# Patient Record
Sex: FEMALE | Race: OTHER | NOT HISPANIC OR LATINO | Employment: FULL TIME | URBAN - METROPOLITAN AREA
[De-identification: names, ages, dates, MRNs, and addresses within clinical notes are randomized per-mention and may not be internally consistent; named-entity substitution may affect disease eponyms.]

---

## 2017-01-21 ENCOUNTER — HOSPITAL ENCOUNTER (EMERGENCY)
Facility: MEDICAL CENTER | Age: 22
End: 2017-01-21
Attending: EMERGENCY MEDICINE
Payer: COMMERCIAL

## 2017-01-21 VITALS
HEART RATE: 89 BPM | HEIGHT: 65 IN | TEMPERATURE: 97.5 F | DIASTOLIC BLOOD PRESSURE: 58 MMHG | SYSTOLIC BLOOD PRESSURE: 105 MMHG | RESPIRATION RATE: 18 BRPM | BODY MASS INDEX: 38.97 KG/M2 | OXYGEN SATURATION: 99 % | WEIGHT: 233.91 LBS

## 2017-01-21 DIAGNOSIS — J03.90 TONSILLITIS: ICD-10-CM

## 2017-01-21 LAB
BASOPHILS # BLD AUTO: 0.2 % (ref 0–1.8)
BASOPHILS # BLD: 0.03 K/UL (ref 0–0.12)
DEPRECATED S PYO AG THROAT QL EIA: NORMAL
EOSINOPHIL # BLD AUTO: 0.03 K/UL (ref 0–0.51)
EOSINOPHIL NFR BLD: 0.2 % (ref 0–6.9)
ERYTHROCYTE [DISTWIDTH] IN BLOOD BY AUTOMATED COUNT: 41.6 FL (ref 35.9–50)
HCT VFR BLD AUTO: 42.7 % (ref 37–47)
HETEROPH AB SER QL: NEGATIVE
HGB BLD-MCNC: 13.6 G/DL (ref 12–16)
IMM GRANULOCYTES # BLD AUTO: 0.05 K/UL (ref 0–0.11)
IMM GRANULOCYTES NFR BLD AUTO: 0.4 % (ref 0–0.9)
LYMPHOCYTES # BLD AUTO: 3.01 K/UL (ref 1–4.8)
LYMPHOCYTES NFR BLD: 23.4 % (ref 22–41)
MCH RBC QN AUTO: 26.6 PG (ref 27–33)
MCHC RBC AUTO-ENTMCNC: 31.9 G/DL (ref 33.6–35)
MCV RBC AUTO: 83.4 FL (ref 81.4–97.8)
MONOCYTES # BLD AUTO: 0.65 K/UL (ref 0–0.85)
MONOCYTES NFR BLD AUTO: 5.1 % (ref 0–13.4)
NEUTROPHILS # BLD AUTO: 9.08 K/UL (ref 2–7.15)
NEUTROPHILS NFR BLD: 70.7 % (ref 44–72)
NRBC # BLD AUTO: 0 K/UL
NRBC BLD AUTO-RTO: 0 /100 WBC
PLATELET # BLD AUTO: 322 K/UL (ref 164–446)
PMV BLD AUTO: 10.2 FL (ref 9–12.9)
RBC # BLD AUTO: 5.12 M/UL (ref 4.2–5.4)
SIGNIFICANT IND 70042: NORMAL
SITE SITE: NORMAL
SOURCE SOURCE: NORMAL
WBC # BLD AUTO: 12.9 K/UL (ref 4.8–10.8)

## 2017-01-21 PROCEDURE — 700105 HCHG RX REV CODE 258: Performed by: EMERGENCY MEDICINE

## 2017-01-21 PROCEDURE — 87880 STREP A ASSAY W/OPTIC: CPT

## 2017-01-21 PROCEDURE — 96361 HYDRATE IV INFUSION ADD-ON: CPT

## 2017-01-21 PROCEDURE — 85025 COMPLETE CBC W/AUTO DIFF WBC: CPT

## 2017-01-21 PROCEDURE — 99284 EMERGENCY DEPT VISIT MOD MDM: CPT

## 2017-01-21 PROCEDURE — 87081 CULTURE SCREEN ONLY: CPT

## 2017-01-21 PROCEDURE — 96374 THER/PROPH/DIAG INJ IV PUSH: CPT

## 2017-01-21 PROCEDURE — 86308 HETEROPHILE ANTIBODY SCREEN: CPT

## 2017-01-21 PROCEDURE — 700111 HCHG RX REV CODE 636 W/ 250 OVERRIDE (IP): Performed by: EMERGENCY MEDICINE

## 2017-01-21 PROCEDURE — 87077 CULTURE AEROBIC IDENTIFY: CPT

## 2017-01-21 RX ORDER — AMOXICILLIN AND CLAVULANATE POTASSIUM 875; 125 MG/1; MG/1
1 TABLET, FILM COATED ORAL 2 TIMES DAILY
Qty: 20 TAB | Refills: 0 | Status: SHIPPED | OUTPATIENT
Start: 2017-01-21 | End: 2017-01-31

## 2017-01-21 RX ORDER — DEXAMETHASONE SODIUM PHOSPHATE 10 MG/ML
10 INJECTION, SOLUTION INTRAMUSCULAR; INTRAVENOUS ONCE
Status: COMPLETED | OUTPATIENT
Start: 2017-01-21 | End: 2017-01-21

## 2017-01-21 RX ORDER — PREDNISONE 20 MG/1
60 TABLET ORAL DAILY
Qty: 15 TAB | Refills: 0 | Status: SHIPPED | OUTPATIENT
Start: 2017-01-21 | End: 2017-01-26

## 2017-01-21 RX ORDER — SODIUM CHLORIDE 9 MG/ML
1000 INJECTION, SOLUTION INTRAVENOUS ONCE
Status: COMPLETED | OUTPATIENT
Start: 2017-01-21 | End: 2017-01-21

## 2017-01-21 RX ADMIN — SODIUM CHLORIDE 1000 ML: 9 INJECTION, SOLUTION INTRAVENOUS at 19:58

## 2017-01-21 RX ADMIN — DEXAMETHASONE SODIUM PHOSPHATE 10 MG: 10 INJECTION, SOLUTION INTRAMUSCULAR; INTRAVENOUS at 19:58

## 2017-01-21 ASSESSMENT — PAIN SCALES - GENERAL: PAINLEVEL_OUTOF10: 10

## 2017-01-21 NOTE — ED AVS SNAPSHOT
Nonoba Access Code: [email or web address]  Expires: 2/20/2017  8:37 PM    Your email address is not on file at [facility].  Email Addresses are required for you to sign up for Nonoba, please contact [phone] to verify your personal information and to provide your email address prior to attempting to register for Nonoba.    Cynthia Padilla  [address]  [place] [address]    Nonoba  A secure, online tool to manage your health information     [facility]’s Nonoba® is a secure, online tool that connects you to your personalized health information from the privacy of your home -- day or night - making it very easy for you to manage your healthcare. Once the activation process is completed, you can even access your medical information using the Nonoba nestor, which is available for free in the Apple Nestor store or Google Play store.     To learn more about Nonoba, visit [email or web address]    There are two levels of access available (as shown below):   My Chart Features  [facility] Primary Care Doctor [facility]  Specialists [facility]  Urgent  Care [facility] Primary Care Doctor   Email your healthcare team securely and privately 24/7 X X X    Manage appointments: schedule your next appointment; view details of past/upcoming appointments X      Request prescription refills. X      View recent personal medical records, including lab and immunizations X X X X   View health record, including health history, allergies, medications X X X X   Read reports about your outpatient visits, procedures, consult and ER notes X X X X   See your discharge summary, which is a recap of your hospital and/or ER visit that includes your diagnosis, lab results, and care plan X X  X     How to register for Nonoba:  Once your e-mail address has been verified, follow the following steps to sign up for Nonoba.     1. Go to  [email or web address]  2. Click on the Sign Up Now box, which takes you to the New Member Sign Up page. You  will need to provide the following information:  a. Enter your [employer] Access Code exactly as it appears at the top of this page. (You will not need to use this code after you’ve completed the sign-up process. If you do not sign up before the expiration date, you must request a new code.)   b. Enter your date of birth.   c. Enter your home email address.   d. Click Submit, and follow the next screen’s instructions.  3. Create a BringMeThatt ID. This will be your [employer] login ID and cannot be changed, so think of one that is secure and easy to remember.  4. Create a [employer] password. You can change your password at any time.  5. Enter your Password Reset Question and Answer. This can be used at a later time if you forget your password.   6. Enter your e-mail address. This allows you to receive e-mail notifications when new information is available in [employer].  7. Click Sign Up. You can now view your health information.    For assistance activating your [employer] account, call [phone]

## 2017-01-21 NOTE — ED AVS SNAPSHOT
After Visit Summary                                                                                                                Cynthia Padilla   MRN: [id number]    Department:  [facility], Emergency Dept   Date of Visit:  1/21/2017            [facility], Emergency Dept    [address] [address]    Phone:  [phone]      You were seen by     Raiza Zazueta M.D.      Your Diagnosis Was     Tonsillitis     J03.90       These are the medications you received during your hospitalization from 01/21/2017 1853 to 01/21/2017 2037     Date/Time Order Dose Route Action    01/21/2017 1958 NS infusion 1,000 mL 1,000 mL Intravenous New Bag    01/21/2017 1958 dexamethasone pf (DECADRON) injection 10 mg 10 mg Intravenous Given      Follow-up Information     1. Follow up with [facility], Emergency Dept.    Specialty:  Emergency Medicine    Why:  return to the emergency department if symptoms worsen at all while being treated    Contact information    [address] [place] [address] [phone]      Medication Information     Review all of your home medications and newly ordered medications with your primary doctor and/or pharmacist as soon as possible. Follow medication instructions as directed by your doctor and/or pharmacist.     Please keep your complete medication list with you and share with your physician. Update the information when medications are discontinued, doses are changed, or new medications (including over-the-counter products) are added; and carry medication information at all times in the event of emergency situations.               Medication List      START taking these medications        Instructions    amoxicillin-clavulanate 875-125 MG Tabs   Commonly known as:  AUGMENTIN    Take 1 Tab by mouth 2 times a day for 10 days.   Dose:  1 Tab       predniSONE 20 MG Tabs   Commonly known as:  DELTASONE    Take 3 Tabs by  mouth every day for 5 days.   Dose:  60 mg         ASK your doctor about these medications        Instructions    ALEVE 220 MG tablet   Generic drug:  naproxen    Take 220 mg by mouth 2 times a day, with meals.   Dose:  220 mg       cyclobenzaprine 10 MG Tabs   Commonly known as:  FLEXERIL    Take 1 Tab by mouth 3 times a day as needed.   Dose:  10 mg       omeprazole 20 MG delayed-release capsule   Commonly known as:  PRILOSEC    Take 40 mg by mouth every day.   Dose:  40 mg       oxycodone-acetaminophen 5-325 MG Tabs   Commonly known as:  PERCOCET    Take 1-2 Tabs by mouth every four hours as needed for Severe Pain.   Dose:  1-2 Tab               Procedures and tests performed during your visit     BETA STREP SCREEN (GP. A)    CBC WITH DIFFERENTIAL    MONONUCLEOSIS TEST QUAL    RAPID STREP, CULT IF INDICATED (CULTURE IF NEGATIVE)        Discharge Instructions       Tonsillitis  Tonsillitis is an infection of the throat that causes the tonsils to become red, tender, and swollen. Tonsils are collections of lymphoid tissue at the back of the throat. Each tonsil has crevices (crypts). Tonsils help fight nose and throat infections and keep infection from spreading to other parts of the body for the first 18 months of life.   CAUSES  Sudden (acute) tonsillitis is usually caused by infection with streptococcal bacteria. Long-lasting (chronic) tonsillitis occurs when the crypts of the tonsils become filled with pieces of food and bacteria, which makes it easy for the tonsils to become repeatedly infected.  SYMPTOMS   Symptoms of tonsillitis include:  · A sore throat, with possible difficulty swallowing.  · White patches on the tonsils.  · Fever.  · Tiredness.  · New episodes of snoring during sleep, when you did not snore before.  · Small, foul-smelling, yellowish-white pieces of material (tonsilloliths) that you occasionally cough up or spit out. The tonsilloliths can also cause you to have bad  breath.  DIAGNOSIS  Tonsillitis can be diagnosed through a physical exam. Diagnosis can be confirmed with the results of lab tests, including a throat culture.  TREATMENT   The goals of tonsillitis treatment include the reduction of the severity and duration of symptoms and prevention of associated conditions. Symptoms of tonsillitis can be improved with the use of steroids to reduce the swelling. Tonsillitis caused by bacteria can be treated with antibiotic medicines. Usually, treatment with antibiotic medicines is started before the cause of the tonsillitis is known. However, if it is determined that the cause is not bacterial, antibiotic medicines will not treat the tonsillitis. If attacks of tonsillitis are severe and frequent, your health care provider may recommend surgery to remove the tonsils (tonsillectomy).  HOME CARE INSTRUCTIONS   · Rest as much as possible and get plenty of sleep.  · Drink plenty of fluids. While the throat is very sore, eat soft foods or liquids, such as sherbet, soups, or instant breakfast drinks.  · Eat frozen ice pops.  · Gargle with a warm or cold liquid to help soothe the throat. Mix 1/4 teaspoon of salt and 1/4 teaspoon of baking soda in 8 oz of water.  SEEK MEDICAL CARE IF:   · Large, tender lumps develop in your neck.  · A rash develops.  · A green, yellow-brown, or bloody substance is coughed up.  · You are unable to swallow liquids or food for 24 hours.  · You notice that only one of the tonsils is swollen.  SEEK IMMEDIATE MEDICAL CARE IF:   · You develop any new symptoms such as vomiting, severe headache, stiff neck, chest pain, or trouble breathing or swallowing.  · You have severe throat pain along with drooling or voice changes.  · You have severe pain, unrelieved with recommended medications.  · You are unable to fully open the mouth.  · You develop redness, swelling, or severe pain anywhere in the neck.  · You have a fever.  MAKE SURE YOU:   · Understand these  instructions.  · Will watch your condition.  · Will get help right away if you are not doing well or get worse.     This information is not intended to replace advice given to you by your health care provider. Make sure you discuss any questions you have with your health care provider.     Document Released: 09/27/2006 Document Revised: 01/08/2016 Document Reviewed: 06/06/2014  [employer] Interactive Patient Education ©2016 [employer]            Patient Information     Patient Information    Following emergency treatment: all patient requiring follow-up care must return either to a private physician or a clinic if your condition worsens before you are able to obtain further medical attention, please return to the emergency room.     Billing Information    At [facility], we work to make the billing process streamlined for our patients.  Our Representatives are here to answer any questions you may have regarding your hospital bill.  If you have insurance coverage and have supplied your insurance information to us, we will submit a claim to your insurer on your behalf.  Should you have any questions regarding your bill, we can be reached online or by phone as follows:  Online: You are able pay your bills online or live chat with our representatives about any billing questions you may have. We are here to help Monday - Friday from 8:00am to 7:30pm and 9:00am - 12:00pm on Saturdays.  Please visit [email or web address]  for more information.   Phone:  [phone] or [phone]    Please note that your emergency physician, surgeon, pathologist, radiologist, anesthesiologist, and other specialists are not employed by [facility] and will therefore bill separately for their services.  Please contact them directly for any questions concerning their bills at the numbers below:     Emergency Physician Services:  [phone]  [facility]:  [phone]  [facility]:  [phone]  [facility]:  [phone]    1. Your final bill may vary from the amount quoted upon discharge if all procedures are not complete at that time, or if your doctor has additional procedures of which we are not aware. You will receive an additional bill if you return to the Emergency Department at [facility] for suture removal regardless of the facility of which the sutures were placed.     2. Please arrange for settlement of this account at the emergency registration.    3. All self-pay accounts are due in full at the time of treatment.  If you are unable to meet this obligation then payment is expected within 4-5 days.     4. If you have had radiology studies (CT, X-ray, Ultrasound, MRI), you have received a preliminary result during your emergency department visit. Please contact the radiology department [phone] to receive a copy of your final result. Please discuss the Final result with your primary physician or with the follow up physician provided.     Crisis Hotline:  [place] Crisis Hotline:  5-766-GYZVVFY or 1-407.700.2247  [place] Crisis Hotline:    1-101.156.2401 or 394-639-7903         ED Discharge Follow Up Questions    1. In order to provide you with very good care, we would like to follow up with a phone call in the next few days.  May we have your permission to contact you?     YES /  NO    2. What is the best phone number to call you? (       )_____-__________    3. What is the best time to call you?      Morning  /  Afternoon  /  Evening                   Patient Signature:  ____________________________________________________________    Date:  ____________________________________________________________

## 2017-01-21 NOTE — ED AVS SNAPSHOT
1/21/2017          Cynthia Padilla  [address]  [place] [address]    Dear Cynthia:    [facility] wants to ensure your discharge home is safe and you or your loved ones have had all your questions answered regarding your care after you leave the hospital.    You may receive a telephone call within two days of your discharge.  This call is to make certain you understand your discharge instructions as well as ensure we provided you with the best care possible during your stay with us.     The call will only last approximately 3-5 minutes and will be done by a nurse.    Once again, we want to ensure your discharge home is safe and that you have a clear understanding of any next steps in your care.  If you have any questions or concerns, please do not hesitate to contact us, we are here for you.  Thank you for choosing [facility] for your healthcare needs.    Sincerely,    Kelvin Bowie    [facility]

## 2017-01-22 NOTE — ED PROVIDER NOTES
"ED Provider Note    Scribed for Raiza Zazueta M.D. by Jessy Menard. 1/21/2017, 7:20 PM.    Primary care provider: Pcp Pt States None  Means of arrival: Walk-In  History obtained from: Patient   History limited by: None     CHIEF COMPLAINT  Chief Complaint   Patient presents with   • Sore Throat     HPI  Cynthia Padilla is a 21 y.o. female who presents to the Emergency Department with persistent throat pain onset one month ago.  At the time of onset, the patient recalls her pain feeling like a \"dry cough\" that has since worsened.  The patient feels like her throat is swelling, from which she has suffered from associated cough.  The patient does not note attempts to treat her pain prior to arrival.  Currently, the patient reports persistent throat pain, left worse than right.  She has not suffered from associated fevers or chills.  The patient does not suffer from pertinent chronic medical conditions.  She denies further pertinent medical history.     REVIEW OF SYSTEMS  Pertinent positives include sore throat, cough. Pertinent negatives include no fevers, chills. All other systems reviewed and negative.     PAST MEDICAL HISTORY   has a past medical history of GERD (gastroesophageal reflux disease).    SURGICAL HISTORY   has past surgical history that includes esvin by laparoscopy (11/21/2015).    SOCIAL HISTORY  Social History   Substance Use Topics   • Smoking status: Never Smoker    • Alcohol Use: No      History   Drug Use No     FAMILY HISTORY  No family history noted.    CURRENT MEDICATIONS  No current facility-administered medications on file prior to encounter.     Current Outpatient Prescriptions on File Prior to Encounter   Medication Sig Dispense Refill   • cyclobenzaprine (FLEXERIL) 10 MG Tab Take 1 Tab by mouth 3 times a day as needed. 30 Tab 0   • oxycodone-acetaminophen (PERCOCET) 5-325 MG Tab Take 1-2 Tabs by mouth every four hours as needed for Severe Pain. 40 Tab 0   • omeprazole (PRILOSEC) " "20 MG delayed-release capsule Take 40 mg by mouth every day.     • naproxen (ALEVE) 220 MG tablet Take 220 mg by mouth 2 times a day, with meals.       ALLERGIES  No Known Allergies    PHYSICAL EXAM  VITAL SIGNS: /73 mmHg  Pulse 109  Temp(Src) 35.7 °C (96.3 °F) (Temporal)  Resp 18  Ht 1.651 m (5' 5\")  Wt 106.1 kg (233 lb 14.5 oz)  BMI 38.92 kg/m2  SpO2 100%    Constitutional:  Alert and in no apparent distress.  HENT: Normocephalic, Bilateral external ears normal. Nose normal. Enlarged tonsils with associated erythema. No posterior exudates.   Eyes: Pupils are equal and reactive. Conjunctiva normal, non-icteric.   Cardiovascular:  Good Perfusion  Thorax & Lungs: Easy unlabored respirations  Abdomen:  No pain with movement   Skin: Visualized skin is  Dry, No erythema, No rash.   Extremities:   Moves all extremities   Neurologic: Alert, Grossly non-focal.   Psychiatric: Appropriate Mood    DIAGNOSTIC STUDIES / PROCEDURES    LABS  Results for orders placed or performed during the hospital encounter of 01/21/17   RAPID STREP, CULT IF INDICATED (CULTURE IF NEGATIVE)   Result Value Ref Range    Significant Indicator NEG     Source THRT     Site THROAT     Rapid Strep Screen       Negative for Group A streptococcus.  A negative result may be obtained if the specimen is  inadequate or antigen concentration is below the  sensitivity of the test. This negative test will be followed  up with a culture as requested.     CBC WITH DIFFERENTIAL   Result Value Ref Range    WBC 12.9 (H) 4.8 - 10.8 K/uL    RBC 5.12 4.20 - 5.40 M/uL    Hemoglobin 13.6 12.0 - 16.0 g/dL    Hematocrit 42.7 37.0 - 47.0 %    MCV 83.4 81.4 - 97.8 fL    MCH 26.6 (L) 27.0 - 33.0 pg    MCHC 31.9 (L) 33.6 - 35.0 g/dL    RDW 41.6 35.9 - 50.0 fL    Platelet Count 322 164 - 446 K/uL    MPV 10.2 9.0 - 12.9 fL    Neutrophils-Polys 70.70 44.00 - 72.00 %    Lymphocytes 23.40 22.00 - 41.00 %    Monocytes 5.10 0.00 - 13.40 %    Eosinophils 0.20 0.00 - 6.90 " %    Basophils 0.20 0.00 - 1.80 %    Immature Granulocytes 0.40 0.00 - 0.90 %    Nucleated RBC 0.00 /100 WBC    Neutrophils (Absolute) 9.08 (H) 2.00 - 7.15 K/uL    Lymphs (Absolute) 3.01 1.00 - 4.80 K/uL    Monos (Absolute) 0.65 0.00 - 0.85 K/uL    Eos (Absolute) 0.03 0.00 - 0.51 K/uL    Baso (Absolute) 0.03 0.00 - 0.12 K/uL    Immature Granulocytes (abs) 0.05 0.00 - 0.11 K/uL    NRBC (Absolute) 0.00 K/uL   MONONUCLEOSIS TEST QUAL   Result Value Ref Range    Heterophile Screen Negative Negative     All labs reviewed by me.    COURSE & MEDICAL DECISION MAKING  Nursing notes and vital signs were reviewed. (See chart for details)  The patient's  records were reviewed, history was obtained from the patient;     The patient presents with throat pain and enlarged tonsils, and the differential diagnosis includes but is not limited to Mono, Strep throat, or viral illness. She has very large tonsils but no evidence of peritonsillar abscess at this time her uvula is midline      7:20 PM Initial orders in the Emergency Department included CBC, Mononucleosis, and Rapid Strep and initial treatment in the Emergency Department and the patient received IV Decadron, 10 mg and an NS Infusion, 1,000 ml.    8:12 PM- The patient's Rapid Strep and Mono test results are back and negative.  The patient will be rechecked once the remainder of her workup is complete, if not sooner.      8:17 PM - Re-examined; The patient is resting in bed comfortably. I discussed her above findings and plans for discharge with a prescription for Deltasone and Augmentin as I suspect she is at risk for a peritonsillar abscess with the degree of tonsillitis she has at this point. Fluids were strongly encouraged. She was instructed to return to the ED if her symptoms worsen.   All questions and concerns were addressed.  Patient understands and agrees. Her most recent set of vitals are /73 mmHg  Pulse 109  Temp(Src) 35.7 °C (96.3 °F) (Temporal)  Resp 18  " Ht 1.651 m (5' 5\")  Wt 106.1 kg (233 lb 14.5 oz)  BMI 38.92 kg/m2  SpO2 100%    Patient has had high blood pressure while in the emergency department, felt likely secondary to medical condition. Counseled patient to monitor blood pressure at home and follow up with primary care physician.      The patient was discharged home with an information sheet on tonsillitis to take home and was told to return immediately for any signs or symptoms listed, including worsening pain.  The patient agreed to the discharge precautions and follow-up plan which is documented in EPIC.    DISPOSITION:  Patient will be discharged home in stable condition.    FOLLOW UP:  [facility], Emergency Dept  [address] [address] [phone]    return to the emergency department if symptoms worsen at all while being treated    OUTPATIENT MEDICATIONS:  New Prescriptions    AMOXICILLIN-CLAVULANATE (AUGMENTIN) 875-125 MG TAB    Take 1 Tab by mouth 2 times a day for 10 days.    PREDNISONE (DELTASONE) 20 MG TAB    Take 3 Tabs by mouth every day for 5 days.     FINAL IMPRESSION  1. Tonsillitis        Jessy CHAPARRO (Sandra), am scribing for, and in the presence of, Raiza Zazueta M.D..    Electronically signed by: Jessy Mneard (Sandra), 1/21/2017    Raiza CHAPARRO M.D. personally performed the services described in this documentation, as scribed by Jessy Menard in my presence, and it is both accurate and complete.    The note accurately reflects work and decisions made by me.  Raiza Zazueta  1/21/2017  8:20 PM    "

## 2017-01-22 NOTE — DISCHARGE INSTRUCTIONS
Tonsillitis  Tonsillitis is an infection of the throat that causes the tonsils to become red, tender, and swollen. Tonsils are collections of lymphoid tissue at the back of the throat. Each tonsil has crevices (crypts). Tonsils help fight nose and throat infections and keep infection from spreading to other parts of the body for the first 18 months of life.   CAUSES  Sudden (acute) tonsillitis is usually caused by infection with streptococcal bacteria. Long-lasting (chronic) tonsillitis occurs when the crypts of the tonsils become filled with pieces of food and bacteria, which makes it easy for the tonsils to become repeatedly infected.  SYMPTOMS   Symptoms of tonsillitis include:  · A sore throat, with possible difficulty swallowing.  · White patches on the tonsils.  · Fever.  · Tiredness.  · New episodes of snoring during sleep, when you did not snore before.  · Small, foul-smelling, yellowish-white pieces of material (tonsilloliths) that you occasionally cough up or spit out. The tonsilloliths can also cause you to have bad breath.  DIAGNOSIS  Tonsillitis can be diagnosed through a physical exam. Diagnosis can be confirmed with the results of lab tests, including a throat culture.  TREATMENT   The goals of tonsillitis treatment include the reduction of the severity and duration of symptoms and prevention of associated conditions. Symptoms of tonsillitis can be improved with the use of steroids to reduce the swelling. Tonsillitis caused by bacteria can be treated with antibiotic medicines. Usually, treatment with antibiotic medicines is started before the cause of the tonsillitis is known. However, if it is determined that the cause is not bacterial, antibiotic medicines will not treat the tonsillitis. If attacks of tonsillitis are severe and frequent, your health care provider may recommend surgery to remove the tonsils (tonsillectomy).  HOME CARE INSTRUCTIONS   · Rest as much as possible and get plenty of  sleep.  · Drink plenty of fluids. While the throat is very sore, eat soft foods or liquids, such as sherbet, soups, or instant breakfast drinks.  · Eat frozen ice pops.  · Gargle with a warm or cold liquid to help soothe the throat. Mix 1/4 teaspoon of salt and 1/4 teaspoon of baking soda in 8 oz of water.  SEEK MEDICAL CARE IF:   · Large, tender lumps develop in your neck.  · A rash develops.  · A green, yellow-brown, or bloody substance is coughed up.  · You are unable to swallow liquids or food for 24 hours.  · You notice that only one of the tonsils is swollen.  SEEK IMMEDIATE MEDICAL CARE IF:   · You develop any new symptoms such as vomiting, severe headache, stiff neck, chest pain, or trouble breathing or swallowing.  · You have severe throat pain along with drooling or voice changes.  · You have severe pain, unrelieved with recommended medications.  · You are unable to fully open the mouth.  · You develop redness, swelling, or severe pain anywhere in the neck.  · You have a fever.  MAKE SURE YOU:   · Understand these instructions.  · Will watch your condition.  · Will get help right away if you are not doing well or get worse.     This information is not intended to replace advice given to you by your health care provider. Make sure you discuss any questions you have with your health care provider.     Document Released: 09/27/2006 Document Revised: 01/08/2016 Document Reviewed: 06/06/2014  [employer] Interactive Patient Education ©2016 [employer]

## 2017-01-22 NOTE — ED NOTES
Discharge instructions given.  All questions answered.  Prescriptions given x2.  Pt to return to ER if symptoms worsen.  Pt verbalized understanding.  All belongings with pt.  Pt ambulated to lobby.

## 2017-01-23 LAB
S PYO SPEC QL CULT: ABNORMAL
S PYO SPEC QL CULT: ABNORMAL
SIGNIFICANT IND 70042: ABNORMAL
SITE SITE: ABNORMAL
SOURCE SOURCE: ABNORMAL

## 2017-01-24 NOTE — ED NOTES
ED Positive Culture Follow-up/Notification Note:    Date: 1/21/17     Patient seen in the ED on 1/21/2017 for persistent throat pain x 1 month, feels like her throat is swelling and has an associated cough.   1. Tonsillitis       Discharge Medication List as of 1/21/2017  8:37 PM      START taking these medications    Details   amoxicillin-clavulanate (AUGMENTIN) 875-125 MG Tab Take 1 Tab by mouth 2 times a day for 10 days., Disp-20 Tab, R-0, Print Rx Paper      predniSONE (DELTASONE) 20 MG Tab Take 3 Tabs by mouth every day for 5 days., Disp-15 Tab, R-0, Print Rx Paper             Allergies: Review of patient's allergies indicates no known allergies.     Final cultures:   Results     Procedure Component Value Units Date/Time    RAPID STREP, CULT IF INDICATED (CULTURE IF NEGATIVE) [[id number]] Collected:  01/21/17 1938    Order Status:  Completed Specimen Information:  Throat from Throat Updated:  01/23/17 1625     Significant Indicator NEG      Source THRT      Site THROAT      Rapid Strep Screen --      Result:        Negative for Group A streptococcus.  A negative result may be obtained if the specimen is  inadequate or antigen concentration is below the  sensitivity of the test. This negative test will be followed  up with a culture as requested.      BETA STREP SCREEN (GP. A) [[id number]]  (Abnormal) Collected:  01/21/17 1938    Order Status:  Completed Specimen Information:  Throat Updated:  01/23/17 1625     Significant Indicator POS (POS)      Source THRT      Site THROAT      Beta Strep Screen Group A No Beta Streptococci Group A isolated. (A)      Beta Strep Screen Group A -- (A)      Result:        Beta Streptococcus Group C  Heavy growth            Plan:   Appropriate antibiotic therapy prescribed. No changes required based upon culture result.      Kaye Cartwright

## 2018-06-01 ENCOUNTER — OFFICE VISIT (OUTPATIENT)
Dept: MEDICAL GROUP | Age: 23
End: 2018-06-01
Payer: COMMERCIAL

## 2018-06-01 VITALS
BODY MASS INDEX: 41.01 KG/M2 | HEIGHT: 64 IN | HEART RATE: 110 BPM | WEIGHT: 240.2 LBS | OXYGEN SATURATION: 98 % | DIASTOLIC BLOOD PRESSURE: 76 MMHG | TEMPERATURE: 98.2 F | SYSTOLIC BLOOD PRESSURE: 114 MMHG

## 2018-06-01 DIAGNOSIS — Z30.09 ENCOUNTER FOR GENERAL COUNSELING AND ADVICE ON CONTRACEPTIVE MANAGEMENT: ICD-10-CM

## 2018-06-01 DIAGNOSIS — F33.1 MODERATE EPISODE OF RECURRENT MAJOR DEPRESSIVE DISORDER (HCC): ICD-10-CM

## 2018-06-01 DIAGNOSIS — E66.01 MORBID OBESITY WITH BMI OF 40.0-44.9, ADULT (HCC): ICD-10-CM

## 2018-06-01 PROBLEM — F32.9 MAJOR DEPRESSIVE DISORDER: Status: ACTIVE | Noted: 2018-06-01

## 2018-06-01 PROCEDURE — 99204 OFFICE O/P NEW MOD 45 MIN: CPT | Performed by: PHYSICIAN ASSISTANT

## 2018-06-01 RX ORDER — SERTRALINE HYDROCHLORIDE 25 MG/1
25 TABLET, FILM COATED ORAL DAILY
Qty: 60 TAB | Refills: 0 | Status: SHIPPED | OUTPATIENT
Start: 2018-06-01 | End: 2018-06-21 | Stop reason: SDUPTHER

## 2018-06-01 ASSESSMENT — PATIENT HEALTH QUESTIONNAIRE - PHQ9
5. POOR APPETITE OR OVEREATING: 2 - MORE THAN HALF THE DAYS
CLINICAL INTERPRETATION OF PHQ2 SCORE: 1
SUM OF ALL RESPONSES TO PHQ QUESTIONS 1-9: 12

## 2018-06-01 NOTE — LETTER
[facility]  Asya Victoria P.A.-C.  [address]  [place] [address]  Fax: [phone]   Authorization for Release/Disclosure of   Protected Health Information   Name: CYNTHIA JAIN : 1995 SSN: [id number]   Address: [address] [place], [place] [id number]  [place] [address] Phone:    [phone] (home)    I authorize the entity listed below to release/disclose the PHI below to:   [facility]/Asya Victoria P.A.-C. and Asya Victoria P.A.-C.   Provider or Entity Name:     Address   City, State, Zip   Phone:      Fax:     Reason for request: continuity of care   Information to be released:    [  ] LAST COLONOSCOPY,  including any PATH REPORT and follow-up  [  ] LAST FIT/COLOGUARD RESULT [  ] LAST DEXA  [  ] LAST MAMMOGRAM  [  ] LAST PAP  [  ] LAST LABS [  ] RETINA EXAM REPORT  [  ] IMMUNIZATION RECORDS  [  ] Release all info      [  ] Check here and initial the line next to each item to release ALL health information INCLUDING  _____ Care and treatment for drug and / or alcohol abuse  _____ HIV testing, infection status, or AIDS  _____ Genetic Testing    DATES OF SERVICE OR TIME PERIOD TO BE DISCLOSED: _____________  I understand and acknowledge that:  * This Authorization may be revoked at any time by you in writing, except if your health information has already been used or disclosed.  * Your health information that will be used or disclosed as a result of you signing this authorization could be re-disclosed by the recipient. If this occurs, your re-disclosed health information may no longer be protected by State or Federal laws.  * You may refuse to sign this Authorization. Your refusal will not affect your ability to obtain treatment.  * This Authorization becomes effective upon signing and will  on (date) __________.      If no date is indicated, this Authorization will  one (1) year from the signature date.    Name: Cynthia Jain    Signature:   Date:     2018       PLEASE FAX REQUESTED  RECORDS BACK TO: [phone]

## 2018-06-04 PROBLEM — Z30.09 ENCOUNTER FOR GENERAL COUNSELING AND ADVICE ON CONTRACEPTIVE MANAGEMENT: Status: ACTIVE | Noted: 2018-06-04

## 2018-06-04 NOTE — PROGRESS NOTES
CC: depression and anxiety, obesity, contraceptive counseling    History of Present Illness: This is a 22 y.o. female new patient who presents today to establish care and discuss the chronic conditions outlined below. This patient previously saw no one for primary care. Other specialists include none. Records for all have been requested. This patient has a past medical history significant for obesity, anxiety and depression.    Encounter for general counseling and advice on contraceptive management  Patient previously on OCPs, but not taking them regularly due to disruption of her schedule. She is interested in long-term contraception and does not want to have a child for about 5 years. We discussed various options for contraception, and she is interested in Mirena IUD. No history of PID. Will order and get her scheduled for insertion.     Major depressive disorder  This is a chronic problem that is worsening.  The patient reports that she has had a difficult time with her mood since her parents got  in her teenage years.  She tells me that she has been largely ignoring this problem, and not dealing with it well.  Now, she is concerned because she feels that her stress is piling up and causing worsening depression and anxiety.  Her PHQ-9 is 12 today, with a PEYTON-7 of 20.  She is not having any SI/HI, and has not ever.  Currently, her most bothersome symptoms are stress, low self-esteem, anhedonia, self-isolation and self criticality.  She tells me that she is having a difficult time at work, because she has very high expectations of herself and spends more time on tasks than she should.  However, she also complains of difficulty focusing at work due to her anxiety.  She is interested in seeing a counselor and starting medication, so I will send a referral to behavioral health and we will start a low-dose of sertraline.  Risks and benefits of this medication discussed, and we discussed that she may also have  some small side effects including headache and nausea, but these should pass.      Patient Active Problem List    Diagnosis Date Noted   • Encounter for general counseling and advice on contraceptive management 06/04/2018   • Morbid obesity with BMI of 40.0-44.9, adult ([facility]) 06/01/2018   • Major depressive disorder 06/01/2018          Additional History:     No Known Allergies    Current medicines (including changes today)  Current Outpatient Prescriptions   Medication Sig Dispense Refill   • sertraline (ZOLOFT) 25 MG tablet Take 1 Tab by mouth every day. Take 1 tab for 5 days, then increase to 2 tabs daily 60 Tab 0     No current facility-administered medications for this visit.      She  has no past medical history on file.  She  has a past surgical history that includes esvin by laparoscopy (11/21/2015).  Social History   Substance Use Topics   • Smoking status: Never Smoker   • Smokeless tobacco: Never Used   • Alcohol use Yes      Comment: occasionally       Family History   Problem Relation Age of Onset   • Hypertension Neg Hx    • Hyperlipidemia Neg Hx    • Cancer Neg Hx      Family Status   Relation Status   • Mother Alive   • Neg Hx        Patient Active Problem List    Diagnosis Date Noted   • Encounter for general counseling and advice on contraceptive management 06/04/2018   • Morbid obesity with BMI of 40.0-44.9, adult ([facility]) 06/01/2018   • Major depressive disorder 06/01/2018         Review of Systems:   Constitutional: Negative for fever, chills, unexpected weight change, fatigue, malaise and generalized weakness.   Eyes: Negative for blurred or double vision, eye pain, eye discharge.  ENT: Negative for headaches, hearing changes, ear pain, ear discharge, rhinorrhea, sinus congestion, sore throat, and neck pain.   Respiratory: Negative for cough, sputum production, chest congestion, dyspnea, wheezing, and crackles.   Cardiovascular: Negative for chest pain, palpitations, orthopnea, and bilateral lower  "extremity edema.   Gastrointestinal: Negative for heartburn, nausea, vomiting, abdominal pain, hematochezia, melena, diarrhea, constipation, and greasy/foul-smelling stools.   Genitourinary: Negative for dysuria, polyuria, hematuria, pyuria, urgency, frequency and incontinence.  Musculoskeletal: Negative for myalgias, back pain, and joint pain.   Skin: Negative for rash, itching, cyanotic skin color change.   Neurological: Negative for dizziness, tingling, tremors, focal sensory deficit, focal weakness and headaches.   Heme: Does not bruise/bleed easily.    Endocrine: Negative for heat or cold intolerance, polydipsia, polyuria.  Psychiatric/Behavioral: Positive for anxiety and depression as discussed above. Negative for depression, suicidal or homicidal ideation and memory loss.         Physical Exam:   Vitals: Blood pressure 114/76, pulse (!) 110, temperature 36.8 °C (98.2 °F), height 1.633 m (5' 4.3\"), weight 109 kg (240 lb 3.2 oz), SpO2 98 %.  BMI: Body mass index is 40.85 kg/m².  General/Constitutional: Vitals as above, well nourished, well developed female in no acute distress  Head: Head is grossly normal & atraumatic.  Eyes: Bilateral conjunctivae clear and not injected, bilateral EOMI, bilateral PERRL  ENT: Bilateral external ears grossly normal in appearance, EACs clear & bilateral TMs visualized with appropriate cone of light reflex, hearing grossly intact. External nares normal in appearance and without discharge or bleeding, bilateral turbinates without erythema or edema and without discharge or bleeding. Good dentition, posterior oropharynx without erythema/edema/exudates.  Neck: Neck supple, no masses, neck non-tender to palpation, no thyromegaly/goiter.  Lymph: No adenopathy in anterior/posterior cervical and supra-/infrascapular nodes.   Respiratory: Normal effort, lungs are clear to auscultation in all fields (anterior, lateral, posterior), no wheezing, rhonchi or rales.  Cardiovascular: Slightly " tachycardic with regular rhythm without murmurs, gallops or rubs, no bilateral lower extremity edema.  Musculoskeletal: Gait grossly normal & not antalgic, no tenderness to percussion of vertebral processes, no CVAT.  Skin: Warm and dry with no apparent rashes or lesions.  Neuro: Gross motor movement intact in all 4 extremities, gross sensation intact to extremities and trunk, gait grossly normal and not antalgic.  Cranial nerve examination: Pupils equally round and react to light. Extraocular muscles are intact. Visual fields intact. No facial droop. Hearing intact to conversation. Soft palate rises symmetrically bilaterally with uvula midline. Tongue midline and cranial nerve 12 intact. No abnormal facial movements. Resisted shoulder shrug 5/5 bilaterally.  Psych: Judgment grossly appropriate, no apparent depression/anxiety.      Health Maintenance: due, will discuss at follow up visit    Imaging/Labs:  N/A    Assessment/Plan:  Care has been established  We reviewed USPSTF guidelines  Records requests sent to previous care providers  Denies intimate partner violence    1. Morbid obesity with BMI of 40.0-44.9, adult (HCC)  - Patient identified as having weight management issue.  Appropriate orders and counseling given.    2. Moderate episode of recurrent major depressive disorder (HCC)  Uncontrolled.  After long discussion, the patient is interested in both medication and counseling to help manage her symptoms.  Instructions and possible side effects discussed in great detail.  She will start with a low-dose of 25 mg for the first 5 days then increase to 2 tabs.  We will follow-up in 2 weeks for recheck and possible dose adjustment.  - Patient has been identified as being depressed and appropriate orders and counseling have been given  - sertraline (ZOLOFT) 25 MG tablet; Take 1 Tab by mouth every day. Take 1 tab for 5 days, then increase to 2 tabs daily  Dispense: 60 Tab; Refill: 0  - REFERRAL TO BEHAVIORAL  HEALTH    3. Encounter for general counseling and advice on contraceptive management  Paperwork filled out for Mirena IUD order.  Once we obtain the device, she will be scheduled for an insertion with Dr. Rice.      Return in about 2 weeks (around 6/15/2018).      Please note that this dictation was created using voice recognition software. I have made every reasonable attempt to correct obvious errors, but I expect that there are errors of grammar and possibly content that I did not discover before finalizing the note.

## 2018-06-04 NOTE — ASSESSMENT & PLAN NOTE
This is a chronic problem that is worsening.  The patient reports that she has had a difficult time with her mood since her parents got  in her teenage years.  She tells me that she has been largely ignoring this problem, and not dealing with it well.  Now, she is concerned because she feels that her stress is piling up and causing worsening depression and anxiety.  Her PHQ-9 is 12 today, with a PEYTON-7 of 20.  She is not having any SI/HI, and has not ever.  Currently, her most bothersome symptoms are stress, low self-esteem, anhedonia, self-isolation and self criticality.  She tells me that she is having a difficult time at work, because she has very high expectations of herself and spends more time on tasks than she should.  However, she also complains of difficulty focusing at work due to her anxiety.  She is interested in seeing a counselor and starting medication, so I will send a referral to behavioral health and we will start a low-dose of sertraline.  Risks and benefits of this medication discussed, and we discussed that she may also have some small side effects including headache and nausea, but these should pass.

## 2018-06-04 NOTE — ASSESSMENT & PLAN NOTE
Patient previously on OCPs, but not taking them regularly due to disruption of her schedule. She is interested in long-term contraception and does not want to have a child for about 5 years. We discussed various options for contraception, and she is interested in Mirena IUD. No history of PID. Will order and get her scheduled for insertion.

## 2018-06-21 ENCOUNTER — OFFICE VISIT (OUTPATIENT)
Dept: MEDICAL GROUP | Age: 23
End: 2018-06-21
Payer: COMMERCIAL

## 2018-06-21 VITALS
HEIGHT: 64 IN | TEMPERATURE: 97.5 F | WEIGHT: 238.4 LBS | DIASTOLIC BLOOD PRESSURE: 66 MMHG | SYSTOLIC BLOOD PRESSURE: 98 MMHG | BODY MASS INDEX: 40.7 KG/M2 | HEART RATE: 79 BPM | OXYGEN SATURATION: 98 %

## 2018-06-21 DIAGNOSIS — F33.1 MODERATE EPISODE OF RECURRENT MAJOR DEPRESSIVE DISORDER (HCC): ICD-10-CM

## 2018-06-21 DIAGNOSIS — Z30.09 ENCOUNTER FOR GENERAL COUNSELING AND ADVICE ON CONTRACEPTIVE MANAGEMENT: ICD-10-CM

## 2018-06-21 DIAGNOSIS — Z23 NEED FOR VACCINATION: ICD-10-CM

## 2018-06-21 PROCEDURE — 99213 OFFICE O/P EST LOW 20 MIN: CPT | Mod: 25 | Performed by: PHYSICIAN ASSISTANT

## 2018-06-21 PROCEDURE — 90471 IMMUNIZATION ADMIN: CPT | Performed by: PHYSICIAN ASSISTANT

## 2018-06-21 PROCEDURE — 90715 TDAP VACCINE 7 YRS/> IM: CPT | Performed by: PHYSICIAN ASSISTANT

## 2018-06-21 ASSESSMENT — PATIENT HEALTH QUESTIONNAIRE - PHQ9
5. POOR APPETITE OR OVEREATING: 0 - NOT AT ALL
CLINICAL INTERPRETATION OF PHQ2 SCORE: 5
SUM OF ALL RESPONSES TO PHQ QUESTIONS 1-9: 17

## 2018-06-21 NOTE — ASSESSMENT & PLAN NOTE
This is an ongoing problem.  The patient reports that she has had some improvement of her symptoms including irritability and self-esteem/overthinking.  She feels like her work life is better, and she is not taking stress and anxiety from her work home.  However, she does admit that she continues to have some lack of interest and anhedonia, as well as fatigue.  She reports that the she has been waking up in the middle the night since taking the medication prior to bed.  However, during the day, she was having some fatigue when she took it in the morning.  Her PHQ 9 is 17 today, up from 12 at her last visit.  However, her krystle 7 is 18, down from 20.  I would like to continue this medication for 3 more weeks to determine if it is coming effective, and she is establishing counseling on 6/25/2018.  Together, I do hope that these 2 modalities will improve her symptoms.  Will continue to monitor.

## 2018-06-22 NOTE — ASSESSMENT & PLAN NOTE
Her Mirena has arrived in the clinic, and we will get her scheduled for a Pap, and IUD insertion, and Chlamydia screening with Dr. Rice.

## 2018-06-22 NOTE — PROGRESS NOTES
CC: depression and anxiety follow up.    History of Present Illness: This is a 22 y.o. female established patient who presents today for follow up since starting sertraline.     Major depressive disorder  This is an ongoing problem.  The patient reports that she has had some improvement of her symptoms including irritability and self-esteem/overthinking.  She feels like her work life is better, and she is not taking stress and anxiety from her work home.  However, she does admit that she continues to have some lack of interest and anhedonia, as well as fatigue.  She reports that the she has been waking up in the middle the night since taking the medication prior to bed.  However, during the day, she was having some fatigue when she took it in the morning.  Her PHQ 9 is 17 today, up from 12 at her last visit.  However, her krystle 7 is 18, down from 20.  I would like to continue this medication for 3 more weeks to determine if it is coming effective, and she is establishing counseling on 6/25/2018.  Together, I do hope that these 2 modalities will improve her symptoms.  Will continue to monitor.    Encounter for general counseling and advice on contraceptive management  Her Mirena has arrived in the clinic, and we will get her scheduled for a Pap, and IUD insertion, and Chlamydia screening with Dr. Rice.      Patient Active Problem List    Diagnosis Date Noted   • Encounter for general counseling and advice on contraceptive management 06/04/2018   • Morbid obesity with BMI of 40.0-44.9, adult ([facility]) 06/01/2018   • Major depressive disorder 06/01/2018      Allergies:Patient has no known allergies.    Current Outpatient Prescriptions   Medication Sig Dispense Refill   • sertraline (ZOLOFT) 50 MG Tab Take 1 Tab by mouth every day. 30 Tab 0     No current facility-administered medications for this visit.        Social History   Substance Use Topics   • Smoking status: Never Smoker   • Smokeless tobacco: Never Used   • Alcohol  "use Yes      Comment: occasionally     Social History     Social History Narrative   • No narrative on file       Family History   Problem Relation Age of Onset   • Hypertension Neg Hx    • Hyperlipidemia Neg Hx    • Cancer Neg Hx        Review of Systems:    Constitutional: Negative for fever, chills, unexpected weight change, fatigue, malaise and generalized weakness.   Musculoskeletal: Negative for myalgias, back pain, and joint pain.   Skin: Negative for rash, itching, cyanotic skin color change.   Neurological: Positive for new sleep disturbance - reports she falls asleep easily, but will wake for 30 minutes in the middle of the night prior to falling back asleep. Negative for dizziness, tingling, tremors, focal sensory deficit, focal weakness and headaches.   Psychiatric/Behavioral: Positive for continued depression with improving anxiety. Negative for suicidal/homicidal ideation and memory loss.            Exam:    Blood pressure (!) 98/66, pulse 79, temperature 36.4 °C (97.5 °F), height 1.633 m (5' 4.3\"), weight 108.1 kg (238 lb 6.4 oz), SpO2 98 %. Body mass index is 40.54 kg/m².    General: Normal appearing. No distress.  Eyes: Conjunctiva clear, lids without ptosis, pupils equal, round and reactive to light  Musculoskeletal: Normal gait. No extremity cyanosis, clubbing, or edema.  Neurologic: Grossly non-focal, DTRs intact.  Skin: Warm and dry.  No obvious lesions.  Psych: Normal mood and affect. Alert and oriented x3. Judgment and insight is normal.      Health Maintenance: due    Assessment/Plan:  1. Need for vaccination  - Tdap =>8yo IM    2. Moderate episode of recurrent major depressive disorder (HCC)  Improving control.  I would like her to continue on this current dose for 3 more weeks, while she is establishing and counseling.  She will follow-up with me at that time, and we will assess the efficacy of the medication after 6 weeks total.  - sertraline (ZOLOFT) 50 MG Tab; Take 1 Tab by mouth every " day.  Dispense: 30 Tab; Refill: 0  - Patient has been identified as being depressed and appropriate orders and counseling have been given    3. Encounter for general counseling and advice on contraceptive management  Her Mirena has arrived at the clinic, so I would like her to get scheduled with Dr. Rice for insertion, Pap, and Chlamydia screening.      Return in about 3 weeks (around 7/12/2018).    Patient was in agreement with this treatment plan and seemed to understand without barriers. All questions were encouraged and answered. Reviewed signs and symptoms of when to seek emergency medical care.     Please note that this dictation was created using voice recognition software. I have made every reasonable attempt to correct obvious errors, but I expect that there may be errors of grammar and possibly content that I did not discover before finalizing the note.

## 2018-06-28 DIAGNOSIS — F33.1 MODERATE EPISODE OF RECURRENT MAJOR DEPRESSIVE DISORDER (HCC): ICD-10-CM

## 2018-06-28 RX ORDER — SERTRALINE HYDROCHLORIDE 25 MG/1
TABLET, FILM COATED ORAL
Qty: 60 TAB | Refills: 0 | OUTPATIENT
Start: 2018-06-28

## 2018-06-28 NOTE — TELEPHONE ENCOUNTER
Spoke to [facility] and state they did get the last order 06/21/2018.

## 2018-07-09 ENCOUNTER — OFFICE VISIT (OUTPATIENT)
Dept: MEDICAL GROUP | Age: 23
End: 2018-07-09
Payer: COMMERCIAL

## 2018-07-09 VITALS
OXYGEN SATURATION: 98 % | TEMPERATURE: 98.1 F | SYSTOLIC BLOOD PRESSURE: 98 MMHG | HEART RATE: 100 BPM | BODY MASS INDEX: 40.05 KG/M2 | HEIGHT: 64 IN | WEIGHT: 234.6 LBS | DIASTOLIC BLOOD PRESSURE: 70 MMHG

## 2018-07-09 DIAGNOSIS — J02.9 PHARYNGITIS, UNSPECIFIED ETIOLOGY: ICD-10-CM

## 2018-07-09 LAB
HETEROPH AB SER QL LA: NORMAL
INT CON NEG: NEGATIVE
INT CON NEG: NEGATIVE
INT CON POS: POSITIVE
INT CON POS: POSITIVE
S PYO AG THROAT QL: NORMAL

## 2018-07-09 PROCEDURE — 86308 HETEROPHILE ANTIBODY SCREEN: CPT | Performed by: PHYSICIAN ASSISTANT

## 2018-07-09 PROCEDURE — 87880 STREP A ASSAY W/OPTIC: CPT | Performed by: PHYSICIAN ASSISTANT

## 2018-07-09 PROCEDURE — 99213 OFFICE O/P EST LOW 20 MIN: CPT | Performed by: PHYSICIAN ASSISTANT

## 2018-07-09 RX ORDER — CLINDAMYCIN HYDROCHLORIDE 300 MG/1
300 CAPSULE ORAL 3 TIMES DAILY
Qty: 30 CAP | Refills: 0 | Status: SHIPPED | OUTPATIENT
Start: 2018-07-09 | End: 2018-07-19

## 2018-07-09 RX ORDER — PREDNISONE 20 MG/1
TABLET ORAL
Qty: 12 TAB | Refills: 0 | Status: SHIPPED | OUTPATIENT
Start: 2018-07-09 | End: 2018-07-16

## 2018-07-09 NOTE — ASSESSMENT & PLAN NOTE
This is a new problem, ongoing x 8 days.  The patient reports reports sore throat without cough, inflammation of anterior cervical lymph nodes, with fever yesterday of 100.8 now taking tylenol as needed for fever.  She reports frequent history of pharyngitis requiring antibiotics and steroids.  She considered going to urgent care, but decided to wait until this appointment since it was Arty scheduled.  However, in the past 2 days she has had a difficult time swallowing any pills and is not feeding herself or hydrating well.    Today, she is afebrile, though she did take Tylenol this morning.  She is slightly tachycardic, with a blood pressure that is normal for her.

## 2018-07-09 NOTE — PATIENT INSTRUCTIONS
1. Take clindamycin 300mg three times a day for 10 days.  2. Take prednisone take 3 tabs for 2 days, then 2 tabs for 2 days, then 1 tab for 2 days  3. Salt water gargles

## 2018-07-09 NOTE — LETTER
July 9, 2018         Patient: Cynthia Padilla   YOB: 1995   Date of Visit: 7/9/2018           To Whom it May Concern:    Cynthia Padilla was seen in my clinic on 7/9/2018. She may return to work on 7/12/2018.    If you have any questions or concerns, please don't hesitate to call.        Sincerely,           Asya Victoria P.A.-C.  Electronically Signed

## 2018-07-09 NOTE — PROGRESS NOTES
CC: pharyngitis x 8 days    History of Present Illness: This is a 22 y.o. female established patient who presents today for sore throat and swollen lymph nodes.     Pharyngitis  This is a new problem, ongoing x 8 days.  The patient reports reports sore throat without cough, inflammation of anterior cervical lymph nodes, with fever yesterday of 100.8 now taking tylenol as needed for fever.  She reports frequent history of pharyngitis requiring antibiotics and steroids.  She considered going to urgent care, but decided to wait until this appointment since it was Arty scheduled.  However, in the past 2 days she has had a difficult time swallowing any pills and is not feeding herself or hydrating well.    Today, she is afebrile, though she did take Tylenol this morning.  She is slightly tachycardic, with a blood pressure that is normal for her.      Patient Active Problem List    Diagnosis Date Noted   • Pharyngitis 07/09/2018   • Encounter for general counseling and advice on contraceptive management 06/04/2018   • Morbid obesity with BMI of 40.0-44.9, adult ([facility]) 06/01/2018   • Major depressive disorder 06/01/2018      Allergies:Patient has no known allergies.    Current Outpatient Prescriptions   Medication Sig Dispense Refill   • clindamycin (CLEOCIN) 300 MG Cap Take 1 Cap by mouth 3 times a day for 10 days. 30 Cap 0   • predniSONE (DELTASONE) 20 MG Tab Take 3 tabs PO for 2 days, then 2 tabs for 2 days, then 1 tab for 2 days 12 Tab 0   • sertraline (ZOLOFT) 50 MG Tab Take 1 Tab by mouth every day. 30 Tab 0     No current facility-administered medications for this visit.        Social History   Substance Use Topics   • Smoking status: Never Smoker   • Smokeless tobacco: Never Used   • Alcohol use Yes      Comment: occasionally     Social History     Social History Narrative   • No narrative on file       Family History   Problem Relation Age of Onset   • Hypertension Neg Hx    • Hyperlipidemia Neg Hx    • Cancer Neg  "Hx        Review of Systems:    Constitutional: Positive for fever, fatigue and weakness. Negative for chills, unexpected weight change, malaise.   Eyes: Negative for pain with EOMs, blurry or double vision or photophobia.  EENT: Positive for sore throat, painful swallowing, pressure in ears, and anterior neck pain. Negative for headaches, vision changes, hearing changes, ear pain, ear discharge, rhinorrhea, sinus congestion, sore throat, and neck pain.   Respiratory: Negative for cough, sputum production, chest congestion, dyspnea.   Cardiovascular: Negative for chest pain, palpitations.   Gastrointestinal: Negative for nausea, vomiting.   Musculoskeletal: Positive for myalgias. Negative for back pain, and joint pain.   Skin: Negative for rash.   Psychiatric/Behavioral: Negative for depression, suicidal/homicidal ideation and memory loss.            Exam:    Blood pressure (!) 98/70, pulse 100, temperature 36.7 °C (98.1 °F), height 1.633 m (5' 4.3\"), weight 106.4 kg (234 lb 9.6 oz), SpO2 98 %. Body mass index is 39.89 kg/m².    General: Ill-appearing. No distress.  Eyes: Conjunctiva clear, lids without ptosis, pupils equal, round and reactive to light  ENT: Tonsils kissing with exudates present. Posterior oropharynx with erythema and edema. Ears normal shape and contour, canals are clear bilaterally, tympanic membranes are benign, nasal mucosa benign.   Neck: Supple without JVD or bruit. Thyroid is not enlarged.  Lymph: Anterior cervical and submandibular adenopathy. No cervical, supra- or infraclavicular lymphadenopathy.  Pulmonary: Normal effort. Clear to ausculation in all fields. No rales, ronchi, or wheezing.  Cardiovascular: Regular rate and rhythm without murmurs, rubs or gallops.  Abdomen: Soft, nontender, nondistended. Bowel sounds present in all four quadrants. Liver and spleen are not palpable.  Musculoskeletal: Normal gait. No extremity cyanosis, clubbing, or edema.  Neurologic: Grossly non-focal, DTRs " intact.  Skin: Warm and dry.  No obvious lesions.  Psych: Normal mood and affect. Alert and oriented x3. Judgment and insight is normal.      Health Maintenance: patient brought immunization record in to be reconciled    Assessment/Plan:  1. Pharyngitis, unspecified etiology  Uncontrolled. Negative rapid strep and mono. However, given severity of symptoms and history of frequent infections, we will treat with antibiotics and steroids. I have encouraged the patient to go directly to the ED if she is unable to maintain her hydration, or if any of her symptoms worsen. She will return for recheck in 4 days.   - POCT Mononucleosis (mono)  - POCT Rapid Strep A  - clindamycin (CLEOCIN) 300 MG Cap; Take 1 Cap by mouth 3 times a day for 10 days.  Dispense: 30 Cap; Refill: 0  - predniSONE (DELTASONE) 20 MG Tab; Take 3 tabs PO for 2 days, then 2 tabs for 2 days, then 1 tab for 2 days  Dispense: 12 Tab; Refill: 0      Return in about 4 days (around 7/13/2018).    Patient was in agreement with this treatment plan and seemed to understand without barriers. All questions were encouraged and answered. Reviewed signs and symptoms of when to seek emergency medical care.     Please note that this dictation was created using voice recognition software. I have made every reasonable attempt to correct obvious errors, but I expect that there may be errors of grammar and possibly content that I did not discover before finalizing the note.

## 2018-07-13 ENCOUNTER — OFFICE VISIT (OUTPATIENT)
Dept: MEDICAL GROUP | Age: 23
End: 2018-07-13
Payer: COMMERCIAL

## 2018-07-13 VITALS
DIASTOLIC BLOOD PRESSURE: 68 MMHG | SYSTOLIC BLOOD PRESSURE: 100 MMHG | OXYGEN SATURATION: 97 % | HEIGHT: 64 IN | WEIGHT: 238.4 LBS | BODY MASS INDEX: 40.7 KG/M2 | TEMPERATURE: 97 F | HEART RATE: 69 BPM

## 2018-07-13 DIAGNOSIS — J02.9 PHARYNGITIS, UNSPECIFIED ETIOLOGY: ICD-10-CM

## 2018-07-13 DIAGNOSIS — F33.1 MODERATE EPISODE OF RECURRENT MAJOR DEPRESSIVE DISORDER (HCC): ICD-10-CM

## 2018-07-13 DIAGNOSIS — R06.83 SNORING: ICD-10-CM

## 2018-07-13 DIAGNOSIS — Z30.09 ENCOUNTER FOR GENERAL COUNSELING AND ADVICE ON CONTRACEPTIVE MANAGEMENT: ICD-10-CM

## 2018-07-13 DIAGNOSIS — J35.1 TONSILLAR HYPERTROPHY: ICD-10-CM

## 2018-07-13 PROCEDURE — 99214 OFFICE O/P EST MOD 30 MIN: CPT | Performed by: PHYSICIAN ASSISTANT

## 2018-07-13 NOTE — ASSESSMENT & PLAN NOTE
This is an ongoing problem.  The patient reports that her depressive symptoms have improved, and she finds herself being less anxious about problems at work.  However, she is having some sleep disturbance and is unsure if this is caused by the medication.  Notably, she is currently on a 6 day burst of steroids to help with her pharyngitis, and we discussed that this could likely be causing her sleep disturbance.  I have encouraged her to finish her steroid burst and continue the Zoloft for the next several weeks.  If after several more weeks she continues to have sleep disturbance, we will consider trying a new medication.

## 2018-07-13 NOTE — ASSESSMENT & PLAN NOTE
This is a recurrent problem. The patient reports that she has this at least once/year, sometimes twice, and requires antibiotics and steroids to manage her symptoms.  She is frustrated due to missing work for this, and is interested in having a consultation with ENT to see if she is a candidate for tonsillectomy.  We had a long discussion about the impact of the surgery on an adult patient, and that she would need at least 1-2 weeks out of work.  She will discuss this with her manager, and see what her sick and short-term disability benefits are so she can get this done.

## 2018-07-13 NOTE — PROGRESS NOTES
CC: pharyngitis, depression, tonsillar hypertrophy    History of Present Illness: This is a 22 y.o. female established patient who presents today for follow-up after recent severe pharyngitis.    Pharyngitis  This is a recurrent problem. The patient reports that she has this at least once/year, sometimes twice, and requires antibiotics and steroids to manage her symptoms.  She is frustrated due to missing work for this, and is interested in having a consultation with ENT to see if she is a candidate for tonsillectomy.  We had a long discussion about the impact of the surgery on an adult patient, and that she would need at least 1-2 weeks out of work.  She will discuss this with her manager, and see what her sick and short-term disability benefits are so she can get this done.    Major depressive disorder  This is an ongoing problem.  The patient reports that her depressive symptoms have improved, and she finds herself being less anxious about problems at work.  However, she is having some sleep disturbance and is unsure if this is caused by the medication.  Notably, she is currently on a 6 day burst of steroids to help with her pharyngitis, and we discussed that this could likely be causing her sleep disturbance.  I have encouraged her to finish her steroid burst and continue the Zoloft for the next several weeks.  If after several more weeks she continues to have sleep disturbance, we will consider trying a new medication.    Encounter for general counseling and advice on contraceptive management  Schedule for Pap and IUD insertion with Dr. Rice on 7/16/2018.      Patient Active Problem List    Diagnosis Date Noted   • Pharyngitis 07/09/2018   • Encounter for general counseling and advice on contraceptive management 06/04/2018   • Morbid obesity with BMI of 40.0-44.9, adult ([facility]) 06/01/2018   • Major depressive disorder 06/01/2018      Allergies:Patient has no known allergies.    Current Outpatient Prescriptions  "  Medication Sig Dispense Refill   • clindamycin (CLEOCIN) 300 MG Cap Take 1 Cap by mouth 3 times a day for 10 days. 30 Cap 0   • predniSONE (DELTASONE) 20 MG Tab Take 3 tabs PO for 2 days, then 2 tabs for 2 days, then 1 tab for 2 days 12 Tab 0   • sertraline (ZOLOFT) 50 MG Tab Take 1 Tab by mouth every day. 30 Tab 0     No current facility-administered medications for this visit.        Social History   Substance Use Topics   • Smoking status: Never Smoker   • Smokeless tobacco: Never Used   • Alcohol use Yes      Comment: occasionally     Social History     Social History Narrative   • No narrative on file       Family History   Problem Relation Age of Onset   • Hypertension Neg Hx    • Hyperlipidemia Neg Hx    • Cancer Neg Hx        Review of Systems:    Constitutional: Positive for fatigue. Negative for fever, chills.   Eyes: Negative for pain with EOMs, blurry or double vision or photophobia.  EENT: Positive for sore throat, but sore neck and difficulty swallowing have improved. Negative for headaches and neck pain.   Respiratory: Negative for cough.   Cardiovascular: Negative for chest pain, palpitations.   Gastrointestinal: Negative for nausea.   Skin: Negative for rash, itching.   Psychiatric/Behavioral: Negative for depression, suicidal/homicidal ideation and memory loss.            Exam:    Blood pressure 100/68, pulse 69, temperature 36.1 °C (97 °F), height 1.633 m (5' 4.3\"), weight 108.1 kg (238 lb 6.4 oz), SpO2 97 %. Body mass index is 40.54 kg/m².    General: Normal appearing. No distress.  Eyes: Conjunctiva clear, lids without ptosis, pupils equal, round and reactive to light  ENT: Tonsils are significantly less edematous and now without exudates, but still 2+ and erythematous. Ears normal shape and contour, canals are clear bilaterally, tympanic membranes are benign, nasal mucosa benign, oropharynx is without erythema, edema or exudates.   Neck: Supple without JVD or bruit. Thyroid is not " enlarged.  Lymph: Mild submandibular and anterior cervical adenopathy, improved from 7/9/208. No supra- or infraclavicular lymphadenopathy.  Pulmonary: Normal effort. Clear to ausculation in all fields. No rales, ronchi, or wheezing.  Cardiovascular: Regular rate and rhythm without murmurs, rubs or gallops.  Musculoskeletal: Normal gait. No extremity cyanosis, clubbing, or edema.  Skin: Warm and dry.  No obvious lesions.  Psych: Normal mood and affect. Alert and oriented x3. Judgment and insight is normal.      Health Maintenance: scheduled for Pap and GC/CT with Dr. Rice on 7/16/2018 with IUD insertion    Assessment/Plan:  1. Tonsillar hypertrophy  Uncontrolled.  The patient reports frequent snoring, and would like to consult with ENT regarding possible tonsillectomy.  - REFERRAL TO ENT    2. Snoring  - REFERRAL TO ENT    3. Pharyngitis, unspecified etiology  Improving.  I have encouraged the patient to finish her antibiotics and steroids as prescribed.  She will follow-up with ENT and schedule follow-up with me after this consultation.    4. Moderate episode of recurrent major depressive disorder (HCC)  Improving control.  We will continue to monitor sleep disturbance as she comes off the steroids, and consider medication change if this is persistent.  She will let me know via my chart if she continues to have problems with this peer      Return if symptoms worsen or fail to improve.    Patient was in agreement with this treatment plan and seemed to understand without barriers. All questions were encouraged and answered. Reviewed signs and symptoms of when to seek emergency medical care.     Please note that this dictation was created using voice recognition software. I have made every reasonable attempt to correct obvious errors, but I expect that there may be errors of grammar and possibly content that I did not discover before finalizing the note.

## 2018-07-16 ENCOUNTER — OFFICE VISIT (OUTPATIENT)
Dept: MEDICAL GROUP | Age: 23
End: 2018-07-16
Payer: COMMERCIAL

## 2018-07-16 ENCOUNTER — HOSPITAL ENCOUNTER (OUTPATIENT)
Facility: MEDICAL CENTER | Age: 23
End: 2018-07-16
Attending: FAMILY MEDICINE
Payer: COMMERCIAL

## 2018-07-16 VITALS
RESPIRATION RATE: 12 BRPM | OXYGEN SATURATION: 94 % | SYSTOLIC BLOOD PRESSURE: 122 MMHG | HEART RATE: 86 BPM | TEMPERATURE: 96.6 F | HEIGHT: 64 IN | BODY MASS INDEX: 40.8 KG/M2 | DIASTOLIC BLOOD PRESSURE: 60 MMHG | WEIGHT: 239 LBS

## 2018-07-16 DIAGNOSIS — Z23 NEED FOR HPV VACCINATION: ICD-10-CM

## 2018-07-16 DIAGNOSIS — Z11.51 SPECIAL SCREENING EXAMINATION FOR HUMAN PAPILLOMAVIRUS (HPV): ICD-10-CM

## 2018-07-16 DIAGNOSIS — Z30.430 ENCOUNTER FOR IUD INSERTION: ICD-10-CM

## 2018-07-16 DIAGNOSIS — Z01.419 PAP SMEAR, LOW-RISK: ICD-10-CM

## 2018-07-16 DIAGNOSIS — Z01.419 WELL WOMAN EXAM: Primary | ICD-10-CM

## 2018-07-16 DIAGNOSIS — Z12.4 ROUTINE CERVICAL SMEAR: ICD-10-CM

## 2018-07-16 LAB
INT CON NEG: NEGATIVE
INT CON POS: POSITIVE
POC URINE PREGNANCY TEST: NEGATIVE

## 2018-07-16 PROCEDURE — 58300 INSERT INTRAUTERINE DEVICE: CPT | Performed by: FAMILY MEDICINE

## 2018-07-16 PROCEDURE — 88175 CYTOPATH C/V AUTO FLUID REDO: CPT

## 2018-07-16 PROCEDURE — 87624 HPV HI-RISK TYP POOLED RSLT: CPT

## 2018-07-16 PROCEDURE — 87491 CHLMYD TRACH DNA AMP PROBE: CPT

## 2018-07-16 PROCEDURE — 81025 URINE PREGNANCY TEST: CPT | Performed by: FAMILY MEDICINE

## 2018-07-16 PROCEDURE — 99385 PREV VISIT NEW AGE 18-39: CPT | Mod: 25 | Performed by: FAMILY MEDICINE

## 2018-07-16 PROCEDURE — 90651 9VHPV VACCINE 2/3 DOSE IM: CPT | Performed by: FAMILY MEDICINE

## 2018-07-16 PROCEDURE — 87591 N.GONORRHOEAE DNA AMP PROB: CPT

## 2018-07-16 PROCEDURE — 90471 IMMUNIZATION ADMIN: CPT | Performed by: FAMILY MEDICINE

## 2018-07-17 DIAGNOSIS — Z11.51 SPECIAL SCREENING EXAMINATION FOR HUMAN PAPILLOMAVIRUS (HPV): ICD-10-CM

## 2018-07-17 DIAGNOSIS — Z01.419 PAP SMEAR, LOW-RISK: ICD-10-CM

## 2018-07-17 DIAGNOSIS — Z12.4 ROUTINE CERVICAL SMEAR: ICD-10-CM

## 2018-07-17 NOTE — PROGRESS NOTES
"Subjective:   CC: WWE, IUD insertion    HPI:     Cynthia Padilla is a 22 y.o. female, established patient of the clinic, presents with the following concerns:     1. Pap smear, low-risk  2. Routine cervical smear  3. Special screening examination for human papillomavirus (HPV)  4. Well woman exam  , sexually active with male partner  Contraception: condoms  Hx of STD: neg  Hx of abnormal pap: neg, this is her first pap  Patient's last menstrual period was 2018., regular, normal flow, minimal cramping.   Denies fever, chills, pelvic pain, dyspareunia, abnormal vaginal bleeding/discharge, genital rash.   Denies nipple bleeding, discharge, breast mass, familial/personal hx of breast/gyn malignancy.   Last mammogram: not indicated for age.     5. Encounter for IUD insertion  Pt is interested in [facility]. She is here for IUD insertion as well.     Current medicines (including changes today)  Current Outpatient Prescriptions   Medication Sig Dispense Refill   • clindamycin (CLEOCIN) 300 MG Cap Take 1 Cap by mouth 3 times a day for 10 days. 30 Cap 0   • sertraline (ZOLOFT) 50 MG Tab Take 1 Tab by mouth every day. 30 Tab 0     No current facility-administered medications for this visit.      She  has no past medical history on file.    I personally reviewed patient's problem list, allergies, medications, family hx, social hx with patient and update EPIC.     REVIEW OF SYSTEMS:  CONSTITUTIONAL:  Denies night sweats, fatigue, malaise, lethargy, fever or chills.  RESPIRATORY:  Denies cough, wheeze, hemoptysis, or shortness of breath.  CARDIOVASCULAR:  Denies chest pains, palpitations, pedal edema     Objective:     Blood pressure 122/60, pulse 86, temperature 35.9 °C (96.6 °F), resp. rate 12, height 1.633 m (5' 4.3\"), weight 108.4 kg (239 lb), last menstrual period 2018, SpO2 94 %, not currently breastfeeding. Body mass index is 40.64 kg/m².    Physical Exam:  Constitutional: awake, alert, in no distress.  Skin: " Warm, dry, good turgor, no rashes, bruises, ulcers in visible areas.  Eye: conjunctiva clear, lids neg for edema or lesions.  Respiratory: Unlabored respiratory effort, lungs clear to auscultation, no wheezes, no rales.  Cardiovascular: Normal S1, S2, no murmur, no pedal edema.  Psych: Oriented x3, affect and mood wnl, intact judgement and insight.   GYN: Unremarkable external genitalia. Negative abnormal vaginal discharge or bleeding, no vaginal rash, cervix in mid position, no concerning lesions on cervix, no cervical motion tenderness, uterus mid position with normal size & shape, no adnexal fullness/mass appreciated on bimanual exam.    Assessment and Plan:   The following treatment plan was discussed    1. Pap smear, low-risk  2. Routine cervical smear  3. Special screening examination for human papillomavirus (HPV)  - THINPREP RFLX HPV ASCUS W/CTNG; Future    4. Well woman exam  - pt is counseled on diet, exercise, vitamin supplement, mental health, sleep, stress  - discussed screening guidelines for pap, STD, mammogram, colonoscopy and vaccine recommendations.     5. Encounter for IUD insertion  Pt is here for Mirena insertion. See procedure note below.     6. Need for HPV vaccination  - 9VHPV VACCINE 2-3 DOSE IM    IUD Procedure Note:  Procedure: Mirena insertion  Indication: Pt is , sexually active, who desires for long-term reversible contraception.   Current contraception: condoms  Patient's last menstrual period was 2018.  Last pap: today, awaiting results.   Written PARQ obtained. Risks, benefits, and potential side effects discussed with patient. She consented to the procedure.     The patient was placed in the lithotomy position and bimanual exam was performed which confirmed that the uterus was not prohibitively anteflexed or retroflexed. A no touch technique was used throughout the procedure. A speculum was placed into vagina and cervix was cleaned with betadine sticks x3. A tenaculum was  placed at the superior lip of the cervix. A plastic sound was advanced through the external and internal os until it reached the fundus of the uterus, the depth was 8 cm. The sound was then withdrawn. The IUD was loaded in a sterile manner and advanced into position. The string was visualized and cut to 6 cm. Hemostasis was achieved with direct pressure. Patient tolerated the procedure well. No immediate complications were noted. She was instructed to use secondary protection (condoms, OCP, etc..) during the first 2 weeks after insertion. She was instructed to return in 2 weeks for string check. I advised her to return sooner if any fever, pelvic pain or abnormal discharge developed. She clearly verbalized understanding of these instructions. I instructed her to take ibuprofen prn pain or cramping and that her first few menstrual cycles may be heavier than usual. She was given a detailed instruction sheet about her IUD. Pt was advised that IUD does not protect against STI's.      Yesi Rice M.D.    Followup: Return in about 2 weeks (around 7/30/2018) for IUD string check.    Please note that this dictation was created using voice recognition software. I have made every reasonable attempt to correct obvious errors, but I expect that there are errors of grammar and possibly content that I did not discover before finalizing the note.

## 2018-07-19 LAB
C TRACH DNA GENITAL QL NAA+PROBE: NEGATIVE
CYTOLOGY REG CYTOL: NORMAL
N GONORRHOEA DNA GENITAL QL NAA+PROBE: NEGATIVE
SPECIMEN SOURCE: NORMAL

## 2018-07-20 DIAGNOSIS — R87.612 LOW GRADE SQUAMOUS INTRAEPITHELIAL LESION ON CYTOLOGIC SMEAR OF CERVIX (LGSIL): ICD-10-CM

## 2018-07-20 PROBLEM — R87.619 ABNORMAL PAP SMEAR OF CERVIX: Status: ACTIVE | Noted: 2018-07-20

## 2018-07-20 PROBLEM — J02.9 PHARYNGITIS: Status: RESOLVED | Noted: 2018-07-09 | Resolved: 2018-07-20

## 2018-07-23 DIAGNOSIS — F33.1 MODERATE EPISODE OF RECURRENT MAJOR DEPRESSIVE DISORDER (HCC): ICD-10-CM

## 2018-07-24 LAB
HPV HR 12 DNA CVX QL NAA+PROBE: POSITIVE
HPV16 DNA SPEC QL NAA+PROBE: NEGATIVE
HPV18 DNA SPEC QL NAA+PROBE: NEGATIVE
SPECIMEN SOURCE: ABNORMAL

## 2018-07-30 ENCOUNTER — APPOINTMENT (OUTPATIENT)
Dept: MEDICAL GROUP | Age: 23
End: 2018-07-30
Payer: COMMERCIAL

## 2018-08-14 DIAGNOSIS — R87.612 LOW GRADE SQUAMOUS INTRAEPITHELIAL LESION ON CYTOLOGIC SMEAR OF CERVIX (LGSIL): ICD-10-CM

## 2018-09-22 DIAGNOSIS — F33.1 MODERATE EPISODE OF RECURRENT MAJOR DEPRESSIVE DISORDER (HCC): ICD-10-CM

## 2018-09-25 RX ORDER — SERTRALINE HYDROCHLORIDE 100 MG/1
TABLET, FILM COATED ORAL
Qty: 90 TAB | Refills: 3 | Status: SHIPPED | OUTPATIENT
Start: 2018-09-25 | End: 2019-09-23 | Stop reason: SDUPTHER

## 2018-10-09 ENCOUNTER — PATIENT MESSAGE (OUTPATIENT)
Dept: MEDICAL GROUP | Age: 23
End: 2018-10-09

## 2018-10-15 ENCOUNTER — APPOINTMENT (OUTPATIENT)
Dept: MEDICAL GROUP | Age: 23
End: 2018-10-15
Payer: COMMERCIAL

## 2018-10-19 ENCOUNTER — APPOINTMENT (OUTPATIENT)
Dept: MEDICAL GROUP | Age: 23
End: 2018-10-19
Payer: COMMERCIAL

## 2018-10-25 ENCOUNTER — APPOINTMENT (OUTPATIENT)
Dept: MEDICAL GROUP | Age: 23
End: 2018-10-25
Payer: COMMERCIAL

## 2019-01-16 ENCOUNTER — TELEPHONE (OUTPATIENT)
Dept: MEDICAL GROUP | Age: 24
End: 2019-01-16

## 2019-01-16 DIAGNOSIS — Z23 NEED FOR VACCINATION: ICD-10-CM

## 2019-08-19 ENCOUNTER — OFFICE VISIT (OUTPATIENT)
Dept: URGENT CARE | Facility: CLINIC | Age: 24
End: 2019-08-19
Payer: COMMERCIAL

## 2019-08-19 VITALS
OXYGEN SATURATION: 96 % | BODY MASS INDEX: 42.49 KG/M2 | TEMPERATURE: 97.5 F | RESPIRATION RATE: 16 BRPM | DIASTOLIC BLOOD PRESSURE: 62 MMHG | WEIGHT: 255 LBS | SYSTOLIC BLOOD PRESSURE: 100 MMHG | HEART RATE: 88 BPM | HEIGHT: 65 IN

## 2019-08-19 DIAGNOSIS — M25.531 RIGHT WRIST PAIN: ICD-10-CM

## 2019-08-19 DIAGNOSIS — M65.4 DE QUERVAIN'S TENOSYNOVITIS, RIGHT: ICD-10-CM

## 2019-08-19 PROCEDURE — 99214 OFFICE O/P EST MOD 30 MIN: CPT | Performed by: NURSE PRACTITIONER

## 2019-08-26 NOTE — PROGRESS NOTES
Subjective:      Cynthia Padilla is a 23 y.o. female who presents with Wrist Pain (Rt wrist pain, x week of pain she dont know what happen)            Wrist Pain    Incident onset: pt reports new onset of right wrist pain that developed about one week ago. She denies any trauma or injury. She reports holding items or gripping causes pain. There was no injury mechanism. The pain is present in the right wrist. The quality of the pain is described as aching. Radiates to: right forearm. The pain has been constant since the incident. The symptoms are aggravated by movement and palpation. She has tried ice and rest for the symptoms. The treatment provided no relief.       Review of Systems   Musculoskeletal: Positive for joint pain (right wrist).   All other systems reviewed and are negative.    History reviewed. No pertinent past medical history.   Past Surgical History:   Procedure Laterality Date   • ARTURO BY LAPAROSCOPY  11/21/2015    Procedure: ARTURO BY LAPAROSCOPY;  Surgeon: Carolyn Jimenes M.D.;  Location: SURGERY [facility];  Service:       Social History     Socioeconomic History   • Marital status:      Spouse name: Not on file   • Number of children: Not on file   • Years of education: Not on file   • Highest education level: Not on file   Occupational History   • Not on file   Social Needs   • Financial resource strain: Not on file   • Food insecurity:     Worry: Not on file     Inability: Not on file   • Transportation needs:     Medical: Not on file     Non-medical: Not on file   Tobacco Use   • Smoking status: Never Smoker   • Smokeless tobacco: Never Used   Substance and Sexual Activity   • Alcohol use: Yes     Comment: occasionally   • Drug use: No   • Sexual activity: Yes     Partners: Male     Comment: , property management   Lifestyle   • Physical activity:     Days per week: Not on file     Minutes per session: Not on file   • Stress: Not on file   Relationships   • Social  "connections:     Talks on phone: Not on file     Gets together: Not on file     Attends [employer] service: Not on file     Active member of club or organization: Not on file     Attends meetings of clubs or organizations: Not on file     Relationship status: Not on file   • Intimate partner violence:     Fear of current or ex partner: Not on file     Emotionally abused: Not on file     Physically abused: Not on file     Forced sexual activity: Not on file   Other Topics Concern   • Not on file   Social History Narrative   • Not on file          Objective:     /62   Pulse 88   Temp 36.4 °C (97.5 °F)   Resp 16   Ht 1.651 m (5' 5\")   Wt 115.7 kg (255 lb)   SpO2 96%   BMI 42.43 kg/m²      Physical Exam   Constitutional: She is oriented to person, place, and time. Vital signs are normal. She appears well-developed and well-nourished.   HENT:   Head: Normocephalic and atraumatic.   Eyes: Pupils are equal, round, and reactive to light. EOM are normal.   Neck: Normal range of motion.   Cardiovascular: Normal rate and regular rhythm.   Pulmonary/Chest: Effort normal.   Musculoskeletal:        Right wrist: She exhibits decreased range of motion and tenderness. She exhibits no bony tenderness, no swelling, no effusion and no crepitus.        Arms:  Neurological: She is alert and oriented to person, place, and time.   Skin: Skin is warm and dry. Capillary refill takes less than 2 seconds.   Psychiatric: She has a normal mood and affect. Her speech is normal and behavior is normal. Thought content normal.   Vitals reviewed.              Assessment/Plan:     1. Right wrist pain    2. De Quervain's tenosynovitis, right    Pt provided with thumb spica removable splint  Advised to wear night and day for one week, then just during the day  Limit repetitive motions and excessive gripping motions  May use ice and warm compresses  Tylenol and ibuprofen as needed for pain  RTC if no pain improvement  Supportive care, " differential diagnoses, and indications for immediate follow-up discussed with patient.    Pathogenesis of diagnosis discussed including typical length and natural progression.      Instructed to return to UC or nearest emergency department if symptoms fail to improve, for any change in condition, further concerns, or new concerning symptoms.  Patient states understanding of the plan of care and discharge instructions.

## 2019-09-23 DIAGNOSIS — F33.1 MODERATE EPISODE OF RECURRENT MAJOR DEPRESSIVE DISORDER (HCC): ICD-10-CM

## 2019-09-23 RX ORDER — SERTRALINE HYDROCHLORIDE 100 MG/1
TABLET, FILM COATED ORAL
Qty: 30 TAB | Refills: 0 | Status: SHIPPED | OUTPATIENT
Start: 2019-09-23 | End: 2019-12-06 | Stop reason: SDUPTHER

## 2019-09-23 NOTE — TELEPHONE ENCOUNTER
Refilled x 1 month.  Please advise patient Asya is no longer with the organization. She should establish with a new PCP as soon as possible for continued care.

## 2019-12-06 DIAGNOSIS — F33.1 MODERATE EPISODE OF RECURRENT MAJOR DEPRESSIVE DISORDER (HCC): ICD-10-CM

## 2019-12-08 RX ORDER — SERTRALINE HYDROCHLORIDE 100 MG/1
100 TABLET, FILM COATED ORAL
Qty: 30 TAB | Refills: 0 | Status: SHIPPED | OUTPATIENT
Start: 2019-12-08

## 2020-04-27 ENCOUNTER — HOSPITAL ENCOUNTER (EMERGENCY)
Dept: HOSPITAL 8 - ED | Age: 25
Discharge: HOME | End: 2020-04-27
Payer: COMMERCIAL

## 2020-04-27 VITALS — SYSTOLIC BLOOD PRESSURE: 117 MMHG | DIASTOLIC BLOOD PRESSURE: 63 MMHG

## 2020-04-27 DIAGNOSIS — Z76.0: ICD-10-CM

## 2020-04-27 DIAGNOSIS — F32.9: Primary | ICD-10-CM

## 2020-04-27 PROCEDURE — 99281 EMR DPT VST MAYX REQ PHY/QHP: CPT

## 2020-07-24 ENCOUNTER — HOSPITAL ENCOUNTER (EMERGENCY)
Dept: HOSPITAL 8 - ED | Age: 25
Discharge: HOME | End: 2020-07-24
Payer: COMMERCIAL

## 2020-07-24 VITALS — WEIGHT: 240.3 LBS | HEIGHT: 65 IN | BODY MASS INDEX: 40.04 KG/M2

## 2020-07-24 VITALS — SYSTOLIC BLOOD PRESSURE: 131 MMHG | DIASTOLIC BLOOD PRESSURE: 82 MMHG

## 2020-07-24 DIAGNOSIS — J03.91: Primary | ICD-10-CM

## 2020-07-24 PROCEDURE — 87147 CULTURE TYPE IMMUNOLOGIC: CPT

## 2020-07-24 PROCEDURE — 96372 THER/PROPH/DIAG INJ SC/IM: CPT

## 2020-07-24 PROCEDURE — 87880 STREP A ASSAY W/OPTIC: CPT

## 2020-07-24 PROCEDURE — 87081 CULTURE SCREEN ONLY: CPT

## 2020-07-24 PROCEDURE — 99283 EMERGENCY DEPT VISIT LOW MDM: CPT

## 2020-07-24 NOTE — NUR
PT VERBALIZED UNDERSTANDING OF DC INSTRUCTIONG, CONVERSING W/O DIFFICULTY, RESP 
EVEN AND UNLABORED, NADN. PT AMBULATED TO THE DC DESK W/ A STEADY GAIT.